# Patient Record
Sex: MALE | Race: WHITE | NOT HISPANIC OR LATINO
[De-identification: names, ages, dates, MRNs, and addresses within clinical notes are randomized per-mention and may not be internally consistent; named-entity substitution may affect disease eponyms.]

---

## 2022-05-26 PROBLEM — Z00.00 ENCOUNTER FOR PREVENTIVE HEALTH EXAMINATION: Status: ACTIVE | Noted: 2022-05-26

## 2022-06-01 ENCOUNTER — APPOINTMENT (OUTPATIENT)
Dept: ORTHOPEDIC SURGERY | Facility: CLINIC | Age: 72
End: 2022-06-01
Payer: MEDICARE

## 2022-06-01 VITALS — BODY MASS INDEX: 25.2 KG/M2 | WEIGHT: 180 LBS | HEIGHT: 71 IN

## 2022-06-01 DIAGNOSIS — E78.00 PURE HYPERCHOLESTEROLEMIA, UNSPECIFIED: ICD-10-CM

## 2022-06-01 PROCEDURE — 99214 OFFICE O/P EST MOD 30 MIN: CPT | Mod: 25

## 2022-06-01 PROCEDURE — 73562 X-RAY EXAM OF KNEE 3: CPT | Mod: LT

## 2022-06-01 PROCEDURE — J3490M: CUSTOM | Mod: LT

## 2022-06-01 PROCEDURE — 20611 DRAIN/INJ JOINT/BURSA W/US: CPT | Mod: LT

## 2022-06-01 NOTE — ASSESSMENT
[FreeTextEntry1] : acute onset of left knee pain since march 2022.  no specific injury or trauma.  no fevers or chills.  most anterior pf pain.  possible pf oa versus meniscus tearing.\par \par retired but working part time and doing some heavy lifting.

## 2022-06-01 NOTE — PHYSICAL EXAM
[5___] : hamstring 5[unfilled]/5 [Equivocal] : equivocal Millicent [] : no erythema [Left] : left knee [Degenerative change] : Degenerative change [TWNoteComboBox7] : flexion 130 degrees [de-identified] : extension 3 degrees

## 2022-06-01 NOTE — DISCUSSION/SUMMARY
Yes [de-identified] : Instructions:  Progress Note completed by Nereida Parikh PA-C\par * Dr. Durant -- The documentation recorded accurately reflects the decisions made by me during this visit.

## 2022-06-01 NOTE — PROCEDURE
[Large Joint Injection] : Large joint injection [Left] : of the left [Knee] : knee [Pain] : pain [Alcohol] : alcohol [Betadine] : betadine [___ cc    3mg] :  Betamethasone ([place]estone) ~Vcc of 3mg [___ cc    1%] : Lidocaine ~Vcc of 1%  [___ cc    0.25%] : Bupivacaine (Marcaine) ~Vcc of 0.25%  [] : Patient tolerated procedure well [Call if redness, pain or fever occur] : call if redness, pain or fever occur [Apply ice for 15min out of every hour for the next 12-24 hours as tolerated] : apply ice for 15 minutes out of every hour for the next 12-24 hours as tolerated [Patient was advised to rest the joint(s) for ____ days] : patient was advised to rest the joint(s) for [unfilled] days [Previous OTC use and PT nontherapeutic] : patient has tried OTC's including aspirin, Ibuprofen, Aleve, etc or prescription NSAIDS, and/or exercises at home and/or physical therapy without satisfactory response [Patient had decreased mobility in the joint] : patient had decreased mobility in the joint [Risks, benefits, alternatives discussed / Verbal consent obtained] : the risks benefits, and alternatives have been discussed, and verbal consent was obtained [All ultrasound images have been permanently captured and stored accordingly in our picture archiving and communication system] : All ultrasound images have been permanently captured and stored accordingly in our picture archiving and communication system [Visualization of the needle and placement of injection was performed without complication] : visualization of the needle and placement of injection was performed without complication [Inflammation] : inflammation [de-identified] : for accurate placement of needle into knee joint

## 2022-06-01 NOTE — HISTORY OF PRESENT ILLNESS
[8] : 8 [4] : 4 [Dull/Aching] : dull/aching [Localized] : localized [Sleep] : sleep [Rest] : rest [Meds] : meds [Ice] : ice [Part time] : Work status: part time [de-identified] : 6/1/22:  acute onset of left knee pain since march 2022.  no specific injury or trauma.  no fevers or chills.  pain worse with bending, squatting, stairs, walking.  symptoms intermittent.  no clicking.  no buckling.   rest, ice, tylenol prn.  [] : no [FreeTextEntry1] : left knee  [FreeTextEntry5] : pt states no injury has occurred, pain began 6-7 weeks ago  [de-identified] : overuse

## 2022-12-21 ENCOUNTER — APPOINTMENT (OUTPATIENT)
Dept: ORTHOPEDIC SURGERY | Facility: CLINIC | Age: 72
End: 2022-12-21

## 2023-01-04 ENCOUNTER — APPOINTMENT (OUTPATIENT)
Dept: ORTHOPEDIC SURGERY | Facility: CLINIC | Age: 73
End: 2023-01-04
Payer: MEDICARE

## 2023-01-04 VITALS — HEIGHT: 71 IN | WEIGHT: 175 LBS | BODY MASS INDEX: 24.5 KG/M2

## 2023-01-04 PROCEDURE — 99213 OFFICE O/P EST LOW 20 MIN: CPT

## 2023-01-04 NOTE — HISTORY OF PRESENT ILLNESS
[8] : 8 [4] : 4 [Dull/Aching] : dull/aching [Localized] : localized [Sleep] : sleep [Rest] : rest [Meds] : meds [Ice] : ice [Part time] : Work status: part time [de-identified] : 6/1/22:  acute onset of left knee pain since march 2022.  no specific injury or trauma.  no fevers or chills.  pain worse with bending, squatting, stairs, walking.  symptoms intermittent.  no clicking.  no buckling.   rest, ice, tylenol prn. \par 1/4/23:  recurrent pain. 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

## 2023-01-04 NOTE — PHYSICAL EXAM
[5___] : hamstring 5[unfilled]/5 [Equivocal] : equivocal Millicent [Left] : left knee [Degenerative change] : Degenerative change [] : no erythema [4___] : quadriceps 4[unfilled]/5 [TWNoteComboBox7] : flexion 130 degrees [de-identified] : extension 0 degrees

## 2023-01-04 NOTE — ASSESSMENT
[FreeTextEntry1] : acute onset of left knee pain since march 2022.  most anterior pf pain.  possible pf oa versus meniscus tearing.  some calcification of meniscus on xray in summer. short term relief with csi.  possible mmt, lmt, oa.\par \par retired but working part time and doing some heavy lifting.

## 2023-01-25 ENCOUNTER — APPOINTMENT (OUTPATIENT)
Dept: ORTHOPEDIC SURGERY | Facility: CLINIC | Age: 73
End: 2023-01-25
Payer: MEDICARE

## 2023-01-25 VITALS — WEIGHT: 175 LBS | BODY MASS INDEX: 24.5 KG/M2 | HEIGHT: 71 IN

## 2023-01-25 DIAGNOSIS — S83.242D OTHER TEAR OF MEDIAL MENISCUS, CURRENT INJURY, LEFT KNEE, SUBSEQUENT ENCOUNTER: ICD-10-CM

## 2023-01-25 DIAGNOSIS — M17.12 UNILATERAL PRIMARY OSTEOARTHRITIS, LEFT KNEE: ICD-10-CM

## 2023-01-25 DIAGNOSIS — S83.92XA SPRAIN OF UNSPECIFIED SITE OF LEFT KNEE, INITIAL ENCOUNTER: ICD-10-CM

## 2023-01-25 PROCEDURE — 99214 OFFICE O/P EST MOD 30 MIN: CPT

## 2023-01-25 NOTE — HISTORY OF PRESENT ILLNESS
[Dull/Aching] : dull/aching [Frequent] : frequent [de-identified] : 6/1/22:  acute onset of left knee pain since march 2022.  no specific injury or trauma.  no fevers or chills.  pain worse with bending, squatting, stairs, walking.  symptoms intermittent.  no clicking.  no buckling.   rest, ice, tylenol prn. \par 1/4/23:  recurrent pain.\par 1/25/23:  some anterior knee pain.   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

## 2023-01-25 NOTE — PHYSICAL EXAM
[Left] : left knee [4___] : quadriceps 4[unfilled]/5 [5___] : hamstring 5[unfilled]/5 [Equivocal] : equivocal Millicent [] : patient ambulates without assistive device [TWNoteComboBox7] : flexion 125 degrees [de-identified] : extension 0 degrees

## 2023-01-25 NOTE — ASSESSMENT
[FreeTextEntry1] : acute onset of left knee pain since march 2022.  most anterior pf pain.  possible pf oa versus meniscus tearing.  some calcification of meniscus on xray in summer. short term relief with csi.  mri shows mmt and oa.  most of pain today anteriorly and improving.\par \par retired but working part time and doing some heavy lifting.

## 2023-05-16 ENCOUNTER — OUTPATIENT (OUTPATIENT)
Dept: OUTPATIENT SERVICES | Facility: HOSPITAL | Age: 73
LOS: 1 days | End: 2023-05-16
Payer: MEDICARE

## 2023-05-16 ENCOUNTER — APPOINTMENT (OUTPATIENT)
Dept: CT IMAGING | Facility: CLINIC | Age: 73
End: 2023-05-16
Payer: MEDICARE

## 2023-05-16 DIAGNOSIS — Z00.00 ENCOUNTER FOR GENERAL ADULT MEDICAL EXAMINATION WITHOUT ABNORMAL FINDINGS: ICD-10-CM

## 2023-05-16 PROCEDURE — 70492 CT SFT TSUE NCK W/O & W/DYE: CPT

## 2023-05-16 PROCEDURE — 70491 CT SOFT TISSUE NECK W/DYE: CPT | Mod: 26
